# Patient Record
Sex: MALE | Race: OTHER | HISPANIC OR LATINO | Employment: FULL TIME | URBAN - METROPOLITAN AREA
[De-identification: names, ages, dates, MRNs, and addresses within clinical notes are randomized per-mention and may not be internally consistent; named-entity substitution may affect disease eponyms.]

---

## 2022-09-30 ENCOUNTER — HOSPITAL ENCOUNTER (EMERGENCY)
Facility: HOSPITAL | Age: 52
Discharge: HOME | End: 2022-09-30
Attending: EMERGENCY MEDICINE
Payer: COMMERCIAL

## 2022-09-30 ENCOUNTER — APPOINTMENT (OUTPATIENT)
Dept: RADIOLOGY | Facility: HOSPITAL | Age: 52
End: 2022-09-30
Payer: COMMERCIAL

## 2022-09-30 VITALS
OXYGEN SATURATION: 98 % | DIASTOLIC BLOOD PRESSURE: 68 MMHG | WEIGHT: 183.86 LBS | RESPIRATION RATE: 16 BRPM | SYSTOLIC BLOOD PRESSURE: 116 MMHG | HEART RATE: 63 BPM | TEMPERATURE: 98.7 F

## 2022-09-30 DIAGNOSIS — R06.02 SOB (SHORTNESS OF BREATH): ICD-10-CM

## 2022-09-30 DIAGNOSIS — R42 DIZZINESS: Primary | ICD-10-CM

## 2022-09-30 LAB
2HR DELTA HS TROPONIN: 0 NG/L
ALBUMIN SERPL BCP-MCNC: 3.6 G/DL (ref 3.5–5)
ALP SERPL-CCNC: 84 U/L (ref 46–116)
ALT SERPL W P-5'-P-CCNC: 20 U/L (ref 12–78)
ANION GAP SERPL CALCULATED.3IONS-SCNC: 7 MMOL/L (ref 4–13)
AST SERPL W P-5'-P-CCNC: 12 U/L (ref 5–45)
BASOPHILS # BLD AUTO: 0.02 THOUSANDS/ΜL (ref 0–0.1)
BASOPHILS NFR BLD AUTO: 0 % (ref 0–1)
BILIRUB SERPL-MCNC: 0.42 MG/DL (ref 0.2–1)
BUN SERPL-MCNC: 15 MG/DL (ref 5–25)
CALCIUM SERPL-MCNC: 9.5 MG/DL (ref 8.3–10.1)
CARDIAC TROPONIN I PNL SERPL HS: 3 NG/L
CARDIAC TROPONIN I PNL SERPL HS: 3 NG/L
CHLORIDE SERPL-SCNC: 103 MMOL/L (ref 96–108)
CO2 SERPL-SCNC: 29 MMOL/L (ref 21–32)
CREAT SERPL-MCNC: 1.05 MG/DL (ref 0.6–1.3)
EOSINOPHIL # BLD AUTO: 0.05 THOUSAND/ΜL (ref 0–0.61)
EOSINOPHIL NFR BLD AUTO: 1 % (ref 0–6)
ERYTHROCYTE [DISTWIDTH] IN BLOOD BY AUTOMATED COUNT: 12.5 % (ref 11.6–15.1)
GFR SERPL CREATININE-BSD FRML MDRD: 80 ML/MIN/1.73SQ M
GLUCOSE SERPL-MCNC: 103 MG/DL (ref 65–140)
HCT VFR BLD AUTO: 44.2 % (ref 36.5–49.3)
HGB BLD-MCNC: 14.4 G/DL (ref 12–17)
IMM GRANULOCYTES # BLD AUTO: 0.03 THOUSAND/UL (ref 0–0.2)
IMM GRANULOCYTES NFR BLD AUTO: 0 % (ref 0–2)
LIPASE SERPL-CCNC: 101 U/L (ref 73–393)
LYMPHOCYTES # BLD AUTO: 0.99 THOUSANDS/ΜL (ref 0.6–4.47)
LYMPHOCYTES NFR BLD AUTO: 14 % (ref 14–44)
MCH RBC QN AUTO: 29 PG (ref 26.8–34.3)
MCHC RBC AUTO-ENTMCNC: 32.6 G/DL (ref 31.4–37.4)
MCV RBC AUTO: 89 FL (ref 82–98)
MONOCYTES # BLD AUTO: 0.51 THOUSAND/ΜL (ref 0.17–1.22)
MONOCYTES NFR BLD AUTO: 7 % (ref 4–12)
NEUTROPHILS # BLD AUTO: 5.58 THOUSANDS/ΜL (ref 1.85–7.62)
NEUTS SEG NFR BLD AUTO: 78 % (ref 43–75)
NRBC BLD AUTO-RTO: 0 /100 WBCS
NT-PROBNP SERPL-MCNC: 70 PG/ML
PLATELET # BLD AUTO: 178 THOUSANDS/UL (ref 149–390)
PMV BLD AUTO: 10.4 FL (ref 8.9–12.7)
POTASSIUM SERPL-SCNC: 3.9 MMOL/L (ref 3.5–5.3)
PROT SERPL-MCNC: 7.8 G/DL (ref 6.4–8.4)
RBC # BLD AUTO: 4.97 MILLION/UL (ref 3.88–5.62)
SARS-COV-2 RNA RESP QL NAA+PROBE: NEGATIVE
SODIUM SERPL-SCNC: 139 MMOL/L (ref 135–147)
WBC # BLD AUTO: 7.18 THOUSAND/UL (ref 4.31–10.16)

## 2022-09-30 PROCEDURE — 83880 ASSAY OF NATRIURETIC PEPTIDE: CPT | Performed by: PHYSICIAN ASSISTANT

## 2022-09-30 PROCEDURE — 99285 EMERGENCY DEPT VISIT HI MDM: CPT

## 2022-09-30 PROCEDURE — 84484 ASSAY OF TROPONIN QUANT: CPT | Performed by: PHYSICIAN ASSISTANT

## 2022-09-30 PROCEDURE — 93005 ELECTROCARDIOGRAM TRACING: CPT

## 2022-09-30 PROCEDURE — 71046 X-RAY EXAM CHEST 2 VIEWS: CPT

## 2022-09-30 PROCEDURE — 83690 ASSAY OF LIPASE: CPT | Performed by: PHYSICIAN ASSISTANT

## 2022-09-30 PROCEDURE — U0003 INFECTIOUS AGENT DETECTION BY NUCLEIC ACID (DNA OR RNA); SEVERE ACUTE RESPIRATORY SYNDROME CORONAVIRUS 2 (SARS-COV-2) (CORONAVIRUS DISEASE [COVID-19]), AMPLIFIED PROBE TECHNIQUE, MAKING USE OF HIGH THROUGHPUT TECHNOLOGIES AS DESCRIBED BY CMS-2020-01-R: HCPCS | Performed by: PHYSICIAN ASSISTANT

## 2022-09-30 PROCEDURE — 99284 EMERGENCY DEPT VISIT MOD MDM: CPT | Performed by: PHYSICIAN ASSISTANT

## 2022-09-30 PROCEDURE — 85025 COMPLETE CBC W/AUTO DIFF WBC: CPT | Performed by: PHYSICIAN ASSISTANT

## 2022-09-30 PROCEDURE — 80053 COMPREHEN METABOLIC PANEL: CPT | Performed by: PHYSICIAN ASSISTANT

## 2022-09-30 PROCEDURE — U0005 INFEC AGEN DETEC AMPLI PROBE: HCPCS | Performed by: PHYSICIAN ASSISTANT

## 2022-09-30 PROCEDURE — 36415 COLL VENOUS BLD VENIPUNCTURE: CPT | Performed by: PHYSICIAN ASSISTANT

## 2022-09-30 PROCEDURE — 96360 HYDRATION IV INFUSION INIT: CPT

## 2022-09-30 RX ORDER — MECLIZINE HCL 12.5 MG/1
12.5 TABLET ORAL ONCE
Status: COMPLETED | OUTPATIENT
Start: 2022-09-30 | End: 2022-09-30

## 2022-09-30 RX ORDER — MECLIZINE HYDROCHLORIDE 25 MG/1
25 TABLET ORAL 3 TIMES DAILY PRN
Qty: 30 TABLET | Refills: 0 | Status: SHIPPED | OUTPATIENT
Start: 2022-09-30

## 2022-09-30 RX ADMIN — MECLIZINE 12.5 MG: 12.5 TABLET ORAL at 13:05

## 2022-09-30 RX ADMIN — SODIUM CHLORIDE 1000 ML: 0.9 INJECTION, SOLUTION INTRAVENOUS at 11:39

## 2022-09-30 NOTE — ED PROVIDER NOTES
History  Chief Complaint   Patient presents with    Dizziness     Dizziness x3 days with associated symptoms of headache and eye pain  Pt stated these are the symptoms he had when his blood pressure was low    Shortness of Breath     Pt having trouble taking a deep breath      Patient is a 77-year-old male with past medical history of epilepsy who presents with dizziness, intermittent headache and shortness of breath for 2-3 days  Patient reports he has had intermittent dizziness which he describes as a feeling of his head spinning over the last 2 days that is worse with position change, but does also intermittently notice at rest   He denies any associated double vision, neck pain or stiffness, numbness, tingling, focal weakness  He does note intermittent gradual onset headache that resolves with ibuprofen, and is not specifically associated with dizziness  Patient denied any eye pain to me  Patient also reports feeling short of breath, which is worse with lying flat and at night, but also notes some AMAYA  Patient notes intermittent chest pain with shortness of breath, but denies any specific aggravating or alleviating factors and denies any pleuritic chest pain, exertional chest pain, palpitations, leg pain or swelling, recent travel, history of DVT/PE, recent surgery or history of cancer  Patient has not tried anything other than ibuprofen to help alleviate his symptoms, last dose yesterday  Patient reports history of epilepsy, but has not been on medication and has not had a seizure for several years  Patient reports he had a general medical evaluation within the last year, unsure when or where and denies any medical problems at that time  Patient states he is otherwise in his usual state of health and denies any fevers, chills, diaphoresis, congestion, cough, abdominal pain, nausea, vomiting, diarrhea, urinary changes  None       History reviewed  No pertinent past medical history      History reviewed  No pertinent surgical history  History reviewed  No pertinent family history  I have reviewed and agree with the history as documented  E-Cigarette/Vaping     E-Cigarette/Vaping Substances     Social History     Tobacco Use    Smoking status: Never Smoker    Smokeless tobacco: Never Used   Substance Use Topics    Alcohol use: Yes     Alcohol/week: 2 0 standard drinks     Types: 2 Glasses of wine per week     Comment: socially    Drug use: Never       Review of Systems   Constitutional: Negative for chills, diaphoresis and fever  HENT: Negative for congestion, ear pain, rhinorrhea and sore throat  Eyes: Positive for visual disturbance (intermittent blurry vision)  Respiratory: Positive for shortness of breath  Negative for cough, wheezing and stridor  Cardiovascular: Positive for chest pain  Negative for palpitations and leg swelling  Gastrointestinal: Negative for abdominal pain, diarrhea, nausea and vomiting  Genitourinary: Negative for difficulty urinating, dysuria, frequency, hematuria and urgency  Musculoskeletal: Negative for myalgias, neck pain and neck stiffness  Skin: Negative for color change, pallor and rash  Neurological: Positive for dizziness and headaches  Negative for weakness, light-headedness and numbness  All other systems reviewed and are negative  Physical Exam  Physical Exam  Vitals and nursing note reviewed  Constitutional:       General: He is awake  He is not in acute distress  Appearance: He is well-developed  He is not ill-appearing, toxic-appearing or diaphoretic  HENT:      Head: Normocephalic and atraumatic  Right Ear: Tympanic membrane, ear canal and external ear normal       Left Ear: Tympanic membrane, ear canal and external ear normal       Nose: Nose normal       Mouth/Throat:      Mouth: Mucous membranes are moist    Eyes:      General: Vision grossly intact  Extraocular Movements: Extraocular movements intact  Right eye: No nystagmus  Left eye: No nystagmus  Conjunctiva/sclera: Conjunctivae normal       Pupils: Pupils are equal, round, and reactive to light  Cardiovascular:      Rate and Rhythm: Normal rate and regular rhythm  Pulses: Normal pulses  Heart sounds: Normal heart sounds, S1 normal and S2 normal    Pulmonary:      Effort: Pulmonary effort is normal  No respiratory distress  Breath sounds: Normal breath sounds  No stridor  No decreased breath sounds or wheezing  Abdominal:      General: Bowel sounds are normal  There is no distension  Palpations: Abdomen is soft  Tenderness: There is no abdominal tenderness  Musculoskeletal:         General: Normal range of motion  Cervical back: Normal range of motion and neck supple  Right lower leg: No edema  Left lower leg: No edema  Comments: Neurovascularly intact, 5/5 strength in bilateral upper and lower extremities  Skin:     General: Skin is warm and dry  Capillary Refill: Capillary refill takes less than 2 seconds  Neurological:      General: No focal deficit present  Mental Status: He is alert and oriented to person, place, and time  GCS: GCS eye subscore is 4  GCS verbal subscore is 5  GCS motor subscore is 6  Cranial Nerves: Cranial nerves are intact  Sensory: Sensation is intact  Motor: Motor function is intact  Coordination: Coordination is intact  Gait: Gait is intact  Psychiatric:         Behavior: Behavior is cooperative           Vital Signs  ED Triage Vitals [09/30/22 1047]   Temperature Pulse Respirations Blood Pressure SpO2   98 7 °F (37 1 °C) 75 16 164/69 98 %      Temp Source Heart Rate Source Patient Position - Orthostatic VS BP Location FiO2 (%)   Oral Monitor Sitting Right arm --      Pain Score       --           Vitals:    09/30/22 1047 09/30/22 1223 09/30/22 1502   BP: 164/69 120/61 116/68   Pulse: 75 68 63   Patient Position - Orthostatic VS: Sitting Lying Lying         Visual Acuity      ED Medications  Medications   sodium chloride 0 9 % bolus 1,000 mL (0 mL Intravenous Stopped 9/30/22 1223)   meclizine (ANTIVERT) tablet 12 5 mg (12 5 mg Oral Given 9/30/22 1305)       Diagnostic Studies  Results Reviewed     Procedure Component Value Units Date/Time    HS Troponin I 2hr [[id number]]  (Normal) Collected: 09/30/22 1347    Lab Status: Final result Specimen: Blood from Arm, Right Updated: 09/30/22 1418     hs TnI 2hr 3 ng/L      Delta 2hr hsTnI 0 ng/L     COVID only [[id number]]  (Normal) Collected: 09/30/22 1156    Lab Status: Final result Specimen: Nares from Nasopharyngeal Swab Updated: 09/30/22 1304     SARS-CoV-2 Negative    Narrative:      FOR PEDIATRIC PATIENTS - copy/paste COVID Guidelines URL to browser: [email or web address]  [email or web address]    SARS-CoV-2 assay is a Nucleic Acid Amplification assay intended for the  qualitative detection of nucleic acid from SARS-CoV-2 in nasopharyngeal  swabs  Results are for the presumptive identification of SARS-CoV-2 RNA  Positive results are indicative of infection with SARS-CoV-2, the virus  causing COVID-19, but do not rule out bacterial infection or co-infection  with other viruses  Laboratories within the [place] and its  territories are required to report all positive results to the appropriate  public health authorities  Negative results do not preclude SARS-CoV-2  infection and should not be used as the sole basis for treatment or other  patient management decisions  Negative results must be combined with  clinical observations, patient history, and epidemiological information  This test has not been FDA cleared or approved  This test has been authorized by FDA under an Emergency Use Authorization  (EUA)   This test is only authorized for the duration of time the  declaration that circumstances exist justifying the authorization of the  emergency use of an in vitro diagnostic tests for detection of SARS-CoV-2  virus and/or diagnosis of COVID-19 infection under section 564(b)(1) of  the Act, 21 U  S C  111BTG-5(D)(8), unless the authorization is terminated  or revoked sooner  The test has been validated but independent review by FDA  and CLIA is pending  Test performed using [employer] GeneXpert: This RT-PCR assay targets N2,  a region unique to SARS-CoV-2  A conserved region in the E-gene was chosen  for pan-Sarbecovirus detection which includes SARS-CoV-2  According to CMS-2020-01-R, this platform meets the definition of high-throughput technology      Lipase [[id number]]  (Normal) Collected: 09/30/22 1138    Lab Status: Final result Specimen: Blood from Arm, Right Updated: 09/30/22 1228     Lipase 101 u/L     NT-BNP PRO [[id number]]  (Normal) Collected: 09/30/22 1138    Lab Status: Final result Specimen: Blood from Arm, Right Updated: 09/30/22 1228     NT-proBNP 70 pg/mL     HS Troponin 0hr (reflex protocol) [[id number]]  (Normal) Collected: 09/30/22 1138    Lab Status: Final result Specimen: Blood from Arm, Right Updated: 09/30/22 1225     hs TnI 0hr 3 ng/L     Comprehensive metabolic panel [[id number]] Collected: 09/30/22 1138    Lab Status: Final result Specimen: Blood from Arm, Right Updated: 09/30/22 1220     Sodium 139 mmol/L      Potassium 3 9 mmol/L      Chloride 103 mmol/L      CO2 29 mmol/L      ANION GAP 7 mmol/L      BUN 15 mg/dL      Creatinine 1 05 mg/dL      Glucose 103 mg/dL      Calcium 9 5 mg/dL      AST 12 U/L      ALT 20 U/L      Alkaline Phosphatase 84 U/L      Total Protein 7 8 g/dL      Albumin 3 6 g/dL      Total Bilirubin 0 42 mg/dL      eGFR 80 ml/min/1 73sq m     Andrés:      Amor guidelines for Chronic Kidney Disease (CKD):     Stage 1 with normal or high GFR (GFR > 90 mL/min/1 73 square meters)    Stage 2 Mild CKD (GFR = 60-89 mL/min/1 73 square meters)    Stage 3A Moderate CKD (GFR = 45-59 mL/min/1 73 square meters)    Stage 3B Moderate CKD (GFR = 30-44 mL/min/1 73 square meters)    Stage 4 Severe CKD (GFR = 15-29 mL/min/1 73 square meters)    Stage 5 End Stage CKD (GFR <15 mL/min/1 73 square meters)  Note: GFR calculation is accurate only with a steady state creatinine    CBC and differential [[id number]]  (Abnormal) Collected: 09/30/22 1138    Lab Status: Final result Specimen: Blood from Arm, Right Updated: 09/30/22 1146     WBC 7 18 Thousand/uL      RBC 4 97 Million/uL      Hemoglobin 14 4 g/dL      Hematocrit 44 2 %      MCV 89 fL      MCH 29 0 pg      MCHC 32 6 g/dL      RDW 12 5 %      MPV 10 4 fL      Platelets 159 Thousands/uL      nRBC 0 /100 WBCs      Neutrophils Relative 78 %      Immat GRANS % 0 %      Lymphocytes Relative 14 %      Monocytes Relative 7 %      Eosinophils Relative 1 %      Basophils Relative 0 %      Neutrophils Absolute 5 58 Thousands/µL      Immature Grans Absolute 0 03 Thousand/uL      Lymphocytes Absolute 0 99 Thousands/µL      Monocytes Absolute 0 51 Thousand/µL      Eosinophils Absolute 0 05 Thousand/µL      Basophils Absolute 0 02 Thousands/µL                  XR chest 2 views   Final Result by Jose Manuel Little MD (09/30 1312)      No acute cardiopulmonary disease                    Workstation performed: [id number]                    Procedures  ECG 12 Lead Documentation Only    Date/Time: 9/30/2022 11:28 AM  Performed by: Micaela Meade PA-C  Authorized by: Micaela Meade PA-C     Indications / Diagnosis:  SOB, dizziness  ECG reviewed by me, the ED Provider: yes    Patient location:  ED  Previous ECG:     Previous ECG:  Unavailable  Rate:     ECG rate:  77    ECG rate assessment: normal    Rhythm:     Rhythm: sinus rhythm    Ectopy:     Ectopy: none    QRS:     QRS axis:  Normal  Conduction:     Conduction: normal    ST segments:     ST segments:  Normal  T waves:     T waves: non-specific      ECG 12 Lead Documentation Only    Date/Time: 9/30/2022 1:45 PM  Performed by: Elmer Lugo PA-C  Authorized by: Elmer Lugo PA-C     Indications / Diagnosis:  Dizziness  ECG reviewed by me, the ED Provider: yes    Patient location:  ED  Previous ECG:     Previous ECG:  Compared to current    Comparison ECG info:  Earlier today    Similarity:  No change  Rate:     ECG rate:  63    ECG rate assessment: normal    Rhythm:     Rhythm: sinus rhythm    Ectopy:     Ectopy: none    QRS:     QRS axis:  Normal  Conduction:     Conduction: normal    ST segments:     ST segments:  Normal  T waves:     T waves: normal               ED Course  ED Course as of 09/30/22 1513   Fri Sep 30, 2022   1229 NT-proBNP: 70   1229 hs TnI 0hr: 3   1229 Lipase: 101   1229 Comprehensive metabolic panel  WNL   2143 SARS-COV-2: Negative   1317 XR chest 2 views  IMPRESSION:     No acute cardiopulmonary disease   1425 hs TnI 2hr: 3   1425 Delta 2hr hsTnI: 0   1438 Reviewed all results with patient, answered questions  Patient ambulatory pulse ox stable  Patient asymptomatic with ambulation and states resolution of dizziness  Patient reports feeling much better  Patient stable for discharge  SBIRT 22yo+    Flowsheet Row Most Recent Value   SBIRT (25 yo +)    In order to provide better care to our patients, we are screening all of our patients for alcohol and drug use  Would it be okay to ask you these screening questions? No Filed at: 09/30/2022 1145                    MDM  Number of Diagnoses or Management Options  Dizziness  SOB (shortness of breath)  Diagnosis management comments: Reviewed all results with patient, answered questions  Patient noted resolution of symptoms, ambulated without issue  Reviewed medication education, treatment at home  Recommended follow-up with PCP for further evaluation of symptoms  The management plan was discussed in detail with the patient at bedside and all questions were answered  Provided both verbal and written instructions   Reviewed red flag symptoms and strict return to ED instructions  Patient notes understanding and agrees to plan  [place] interpretation services utilized for entirety of ED visit including history, physical exam, education and discharge instructions  Disposition  Final diagnoses:   Dizziness - intermittent   SOB (shortness of breath) - intermittent     Time reflects when diagnosis was documented in both MDM as applicable and the Disposition within this note     Time User Action Codes Description Comment    9/30/2022  2:39 PM Delores Gunnin Add [R42] Dizziness     9/30/2022  2:39 PM Lacie Giang Add [R06 02] SOB (shortness of breath)     9/30/2022  2:39 PM Lacie Giang Modify [R42] Dizziness intermittent    9/30/2022  2:39 PM Lacie Giang Modify [R06 02] SOB (shortness of breath) intermittent      ED Disposition     ED Disposition   Discharge    Condition   Stable    Date/Time   Fri Sep 30, 2022  2:39 PM    [address] discharge to home/self care  Follow-up Information     Follow up With Specialties Details Why Contact Info Additional Information    [facility] Emergency Department Emergency Medicine  If symptoms worsen [facility] [id number]  [address] [place] [address] Emergency Department, [address] [place] [address] , Kayleigh Mariee MD Internal Medicine In 1 week  [address] [phone]             Patient's Medications   Discharge Prescriptions    MECLIZINE (ANTIVERT) 25 MG TABLET    Take 1 tablet (25 mg total) by mouth 3 (three) times a day as needed for dizziness       Start Date: 9/30/2022 End Date: --       Order Dose: 25 mg       Quantity: 30 tablet    Refills: 0       No discharge procedures on file      PDMP Review     None          ED Provider  Electronically Signed by           Aracelis Aleman PA-C  09/30/22 [facility] Dora Lion PA-C  09/30/22 Shanti Hernandez

## 2022-09-30 NOTE — DISCHARGE INSTRUCTIONS
Take meclizine as prescribed as needed for dizziness  Take Tylenol, Motrin home as needed for pain  Follow-up with PCP for further evaluation of symptoms  Return to ED if symptoms worsen including persistent dizziness, severe headache, vision changes, numbness, tingling, weakness, chest pain, difficulty breathing, palpitations

## 2022-09-30 NOTE — ED NOTES
Ambulatory O2 around department started and remained at 98% with HR 79       Doc Serve  09/30/22 1354

## 2022-10-01 LAB
ATRIAL RATE: 63 BPM
ATRIAL RATE: 77 BPM
P AXIS: 42 DEGREES
P AXIS: 52 DEGREES
PR INTERVAL: 178 MS
PR INTERVAL: 184 MS
QRS AXIS: 74 DEGREES
QRS AXIS: 83 DEGREES
QRSD INTERVAL: 100 MS
QRSD INTERVAL: 90 MS
QT INTERVAL: 372 MS
QT INTERVAL: 394 MS
QTC INTERVAL: 403 MS
QTC INTERVAL: 420 MS
T WAVE AXIS: 29 DEGREES
T WAVE AXIS: 53 DEGREES
VENTRICULAR RATE: 63 BPM
VENTRICULAR RATE: 77 BPM

## 2022-10-01 PROCEDURE — 93010 ELECTROCARDIOGRAM REPORT: CPT
